# Patient Record
Sex: MALE | Race: WHITE | Employment: OTHER | URBAN - METROPOLITAN AREA
[De-identification: names, ages, dates, MRNs, and addresses within clinical notes are randomized per-mention and may not be internally consistent; named-entity substitution may affect disease eponyms.]

---

## 2018-11-19 ENCOUNTER — HOSPITAL ENCOUNTER (OUTPATIENT)
Age: 73
Discharge: HOME | End: 2018-11-21

## 2018-11-19 LAB
ABO/RH: NORMAL
ANTIBODY SCREEN: NORMAL
HCT VFR BLD CALC: 37.3 % (ref 37–54)
HEMOGLOBIN: 12 G/DL (ref 12.5–16.5)
MCH RBC QN AUTO: 30.8 PG (ref 26–35)
MCHC RBC AUTO-ENTMCNC: 32.2 % (ref 32–34.5)
MCV RBC AUTO: 95.6 FL (ref 80–99.9)
PDW BLD-RTO: 13.3 FL (ref 11.5–15)
PLATELET # BLD: 331 E9/L (ref 130–450)
PMV BLD AUTO: 9 FL (ref 7–12)
RBC # BLD: 3.9 E12/L (ref 3.8–5.8)
WBC # BLD: 6.7 E9/L (ref 4.5–11.5)

## 2018-11-19 PROCEDURE — 87088 URINE BACTERIA CULTURE: CPT

## 2018-11-19 PROCEDURE — 86901 BLOOD TYPING SEROLOGIC RH(D): CPT

## 2018-11-19 PROCEDURE — 86850 RBC ANTIBODY SCREEN: CPT

## 2018-11-19 PROCEDURE — 86900 BLOOD TYPING SEROLOGIC ABO: CPT

## 2018-11-19 PROCEDURE — 85027 COMPLETE CBC AUTOMATED: CPT

## 2018-11-19 PROCEDURE — 87081 CULTURE SCREEN ONLY: CPT

## 2018-11-21 LAB — MRSA CULTURE ONLY: NORMAL

## 2018-11-22 LAB — URINE CULTURE, ROUTINE: NORMAL

## 2018-11-29 ENCOUNTER — HOSPITAL ENCOUNTER (OUTPATIENT)
Age: 73
Discharge: HOME | End: 2018-12-01

## 2018-11-29 LAB
ANION GAP SERPL CALCULATED.3IONS-SCNC: 15 MMOL/L (ref 7–16)
BUN BLDV-MCNC: 16 MG/DL (ref 8–23)
CALCIUM SERPL-MCNC: 8.9 MG/DL (ref 8.6–10.2)
CHLORIDE BLD-SCNC: 92 MMOL/L (ref 98–107)
CO2: 26 MMOL/L (ref 22–29)
CREAT SERPL-MCNC: 0.9 MG/DL (ref 0.7–1.2)
GFR AFRICAN AMERICAN: >60
GFR NON-AFRICAN AMERICAN: >60 ML/MIN/1.73
GLUCOSE BLD-MCNC: 93 MG/DL (ref 74–99)
HCT VFR BLD CALC: 36.9 % (ref 37–54)
HEMOGLOBIN: 11.6 G/DL (ref 12.5–16.5)
POTASSIUM SERPL-SCNC: 3.7 MMOL/L (ref 3.5–5)
SODIUM BLD-SCNC: 133 MMOL/L (ref 132–146)

## 2018-11-29 PROCEDURE — 85014 HEMATOCRIT: CPT

## 2018-11-29 PROCEDURE — 80048 BASIC METABOLIC PNL TOTAL CA: CPT

## 2018-11-29 PROCEDURE — 85018 HEMOGLOBIN: CPT

## 2019-11-12 ENCOUNTER — HOSPITAL ENCOUNTER (OUTPATIENT)
Age: 74
Discharge: HOME | End: 2019-11-14

## 2019-11-12 PROCEDURE — 88304 TISSUE EXAM BY PATHOLOGIST: CPT

## 2020-11-18 ENCOUNTER — OFFICE VISIT (OUTPATIENT)
Dept: PODIATRY | Age: 75
End: 2020-11-18
Payer: MEDICARE

## 2020-11-18 VITALS — HEIGHT: 71 IN | BODY MASS INDEX: 36.4 KG/M2 | TEMPERATURE: 97.4 F | WEIGHT: 260 LBS

## 2020-11-18 PROBLEM — Q66.6 PES VALGUS: Status: ACTIVE | Noted: 2020-11-18

## 2020-11-18 PROBLEM — I87.2 VENOUS INSUFFICIENCY (CHRONIC) (PERIPHERAL): Status: ACTIVE | Noted: 2020-11-18

## 2020-11-18 PROBLEM — S90.821A BLISTER OF RIGHT FOOT: Status: ACTIVE | Noted: 2020-11-18

## 2020-11-18 PROCEDURE — 99203 OFFICE O/P NEW LOW 30 MIN: CPT | Performed by: PODIATRIST

## 2020-11-18 NOTE — LETTER
[address] [facility], [address] [place] [address]    Phone: [phone]  Fax: [phone]    Alley Monzon  [id number]   1945 11/18/2020      Dear Dr. Chace Field,    I would like to thank you for the kind referral of Jeanne Monzon. He presented to the office today for evaluation regarding new onset blister plantar aspect right foot. We did discuss conservative dressing changes to the area to be performed daily. Did discuss use of his custom foot orthoses and supportive shoe gear to allow for improvement in symptoms as well. We will have continued follow-up until issues are resolved. If you should have any questions concerning his visit today, please do not hesitate to contact me.     Sincerely,    Dorice Boas, DPM

## 2020-11-18 NOTE — PROGRESS NOTES
20     Lucita Baig    : 1945 Sex: male   Age: 70 y.o. Patient was referred by: Samira Dockery MD  Patient's PCP/Provider is:  Samira Dockery MD    Subjective:    Patient seen today for evaluation regarding blister plantar aspect right foot. Chief Complaint   Patient presents with    Wound Check       HPI: Patient stated he noticed the area over the last several weeks. He has been trying to treat the area with topical antibiotic and dry dressings without complete improvement noted. Patient is in no acute distress today. He denies any nausea, vomiting, fever, chills. No other additional abnormalities noted at this time. ROS:  Const: Positives and pertinent negatives as per HPI. Musculo: Denies symptoms other than stated above. Neuro: Denies symptoms other than stated above. Skin: Denies symptoms other than stated above. Current Medications:    Current Outpatient Medications:     aspirin 81 MG tablet, Take 81 mg by mouth daily. , Disp: , Rfl:     amLODIPine (NORVASC) 10 MG tablet, Take 10 mg by mouth daily. , Disp: , Rfl:     metoprolol (TOPROL-XL) 50 MG XL tablet, Take 50 mg by mouth daily. , Disp: , Rfl:     losartan-hydrochlorothiazide (HYZAAR) 100-12.5 MG per tablet, Take 1 tablet by mouth daily. , Disp: , Rfl:     pravastatin (PRAVACHOL) 40 MG tablet, Take 40 mg by mouth daily. , Disp: , Rfl:     Allergies:  No Known Allergies    Vitals:    20 1121   Temp: 97.4 °F (36.3 °C)   Weight: 260 lb (117.9 kg)   Height: 5' 11\" (1.803 m)        Past Medical History:   Diagnosis Date    Hyperlipidemia     Hypertension      No family history on file. No past surgical history on file. Social History     Tobacco Use    Smoking status: Never Smoker   Substance Use Topics    Alcohol use: No    Drug use: No           Diagnostic studies:    No results found. Procedures:    None    Exam:  VASCULAR: Pedal pulses palpable right foot.   Capillary fill time less than 5 seconds digits one through 5 right foot. Minimal hair growth noted right foot. NEUROLOGICAL: Paresthesias are noted to the right lower extremity. Protective sensations diminished right lower extremity. DERMATOLOGICAL: Blister region noted plantar right arch area approximately 0.4 cm in diameter. No serous fluid or abscess formation present right foot. No signs of infection noted right foot. Mild callus noted distal aspect right second toe without complicating features noted. MUSCULOSKELETAL: Severe valgus issues noted right foot. Limited range of motion right ankle and subtalar joint. Mild contraction deformities noted lesser digits right foot. Plan Per Assessment  Leif Hi was seen today for wound check. Diagnoses and all orders for this visit:    Blister of right foot, initial encounter    Pes valgus    Venous insufficiency (chronic) (peripheral)        1. New patient evaluation and management  2. We did discuss treatment options regarding the blister region right foot. We did recommend utilizing alginate dressing followed by a dry dressing to be changed daily. 3. Patient is to continue wearing his custom foot orthoses with his new supportive shoe gear he just recently purchased. 4. Patient will be followed up in 1 week's time for continued evaluation and management. He was advised to call the office with any questions or concerns in the interim. Seen By:    Sujata Rose DPM    Electronically signed by Sujata Rose DPM on 11/18/2020 at 11:41 AM      This note was created using voice recognition software. The note was reviewed however may contain grammatical errors.

## 2020-11-25 ENCOUNTER — OFFICE VISIT (OUTPATIENT)
Dept: PODIATRY | Age: 75
End: 2020-11-25
Payer: MEDICARE

## 2020-11-25 VITALS — TEMPERATURE: 97.6 F

## 2020-11-25 PROCEDURE — 4040F PNEUMOC VAC/ADMIN/RCVD: CPT | Performed by: PODIATRIST

## 2020-11-25 PROCEDURE — G8427 DOCREV CUR MEDS BY ELIG CLIN: HCPCS | Performed by: PODIATRIST

## 2020-11-25 PROCEDURE — G8484 FLU IMMUNIZE NO ADMIN: HCPCS | Performed by: PODIATRIST

## 2020-11-25 PROCEDURE — G8417 CALC BMI ABV UP PARAM F/U: HCPCS | Performed by: PODIATRIST

## 2020-11-25 PROCEDURE — 1123F ACP DISCUSS/DSCN MKR DOCD: CPT | Performed by: PODIATRIST

## 2020-11-25 PROCEDURE — 4004F PT TOBACCO SCREEN RCVD TLK: CPT | Performed by: PODIATRIST

## 2020-11-25 PROCEDURE — 99213 OFFICE O/P EST LOW 20 MIN: CPT | Performed by: PODIATRIST

## 2020-11-25 PROCEDURE — 3017F COLORECTAL CA SCREEN DOC REV: CPT | Performed by: PODIATRIST

## 2020-12-31 ENCOUNTER — OFFICE VISIT (OUTPATIENT)
Dept: PODIATRY | Age: 75
End: 2020-12-31
Payer: MEDICARE

## 2020-12-31 VITALS — BODY MASS INDEX: 35.84 KG/M2 | WEIGHT: 256 LBS | HEIGHT: 71 IN

## 2020-12-31 PROBLEM — M20.41 HAMMER TOE OF RIGHT FOOT: Status: ACTIVE | Noted: 2020-12-31

## 2020-12-31 PROBLEM — L84 CORNS AND CALLUS: Status: ACTIVE | Noted: 2020-12-31

## 2020-12-31 PROBLEM — S90.821A BLISTER OF RIGHT FOOT: Status: RESOLVED | Noted: 2020-11-18 | Resolved: 2020-12-31

## 2020-12-31 PROCEDURE — G8427 DOCREV CUR MEDS BY ELIG CLIN: HCPCS | Performed by: PODIATRIST

## 2020-12-31 PROCEDURE — 99213 OFFICE O/P EST LOW 20 MIN: CPT | Performed by: PODIATRIST

## 2020-12-31 PROCEDURE — G8417 CALC BMI ABV UP PARAM F/U: HCPCS | Performed by: PODIATRIST

## 2020-12-31 PROCEDURE — 3017F COLORECTAL CA SCREEN DOC REV: CPT | Performed by: PODIATRIST

## 2020-12-31 PROCEDURE — 1036F TOBACCO NON-USER: CPT | Performed by: PODIATRIST

## 2020-12-31 PROCEDURE — G8484 FLU IMMUNIZE NO ADMIN: HCPCS | Performed by: PODIATRIST

## 2020-12-31 PROCEDURE — 1123F ACP DISCUSS/DSCN MKR DOCD: CPT | Performed by: PODIATRIST

## 2020-12-31 PROCEDURE — 4040F PNEUMOC VAC/ADMIN/RCVD: CPT | Performed by: PODIATRIST

## 2020-12-31 NOTE — PROGRESS NOTES
20     Gus Kong    : 1945   Sex: male    Age: 70 y.o. Patient's PCP/Provider is:  Ariana Rogers MD    Subjective:  Patient is seen today for follow-up regarding continued evaluation regarding painful callus plantar arch region right foot. Patient also having issues with hammertoe deformity right second toe. Patient has been utilizing the moleskin padding in the toe crest pads as previously instructed. He denies any additional issues at this time. He has been wearing his good appropriate shoe gear as instructed. Chief Complaint   Patient presents with    Callouses     RIGHT FOOT        ROS:  Const: Positives and pertinent negatives as per HPI. Musculo: Denies symptoms other than stated above. Neuro: Denies symptoms other than stated above. Skin: Denies symptoms other than stated above. Current Medications:    Current Outpatient Medications:     aspirin 81 MG tablet, Take 81 mg by mouth daily. , Disp: , Rfl:     amLODIPine (NORVASC) 10 MG tablet, Take 10 mg by mouth daily. , Disp: , Rfl:     metoprolol (TOPROL-XL) 50 MG XL tablet, Take 50 mg by mouth daily. , Disp: , Rfl:     losartan-hydrochlorothiazide (HYZAAR) 100-12.5 MG per tablet, Take 1 tablet by mouth daily. , Disp: , Rfl:     pravastatin (PRAVACHOL) 40 MG tablet, Take 40 mg by mouth daily. , Disp: , Rfl:     Allergies:  No Known Allergies    Vitals:    20 0859   Weight: 256 lb (116.1 kg)   Height: 5' 11\" (1.803 m)       Exam:  Neurovascular status unchanged. Hyperkeratotic area noted plantar lateral right midfoot region. Upon partial thickness, nonexcisional debridement no underlying ulceration noted. No signs of infection noted right foot. Hammertoe, elongated right second digital area noted. Mild tenderness noted to palpation due to deformity present. No maceration of webspaces noted right foot. Valgus issues noted right lower extremity.       Diagnostic Studies:     No results

## 2020-12-31 NOTE — PROGRESS NOTES
Patient is in today for evaluation of painful area on the bottom of his right foot. Patient says the area was previously open, but now is a hardened calloused area.  pcp is Tejal Diaz MD

## 2021-01-21 ENCOUNTER — TELEPHONE (OUTPATIENT)
Dept: PODIATRY | Age: 76
End: 2021-01-21

## 2021-01-21 NOTE — TELEPHONE ENCOUNTER
Left couple messages on patients phone informing the missed appt with dr Oscar Vazquez on 1/14/2021, left message to see if patient would like to reschedule and no answer. Patient can call the office back to reschedule if needed.         Electronically signed by Cosme Burrell MA on 1/21/2021 at 9:05 AM

## 2021-08-18 ENCOUNTER — OFFICE VISIT (OUTPATIENT)
Dept: PODIATRY | Age: 76
End: 2021-08-18
Payer: MEDICARE

## 2021-08-18 VITALS — HEIGHT: 72 IN | WEIGHT: 256 LBS | BODY MASS INDEX: 34.67 KG/M2

## 2021-08-18 DIAGNOSIS — Q66.6 PES VALGUS OF RIGHT FOOT: Primary | ICD-10-CM

## 2021-08-18 DIAGNOSIS — M20.41 HAMMER TOE OF RIGHT FOOT: ICD-10-CM

## 2021-08-18 DIAGNOSIS — M79.671 RIGHT FOOT PAIN: ICD-10-CM

## 2021-08-18 DIAGNOSIS — M19.071 ARTHRITIS OF MIDTARSAL JOINT OF RIGHT FOOT: ICD-10-CM

## 2021-08-18 DIAGNOSIS — R26.2 DIFFICULTY WALKING: ICD-10-CM

## 2021-08-18 PROCEDURE — 1123F ACP DISCUSS/DSCN MKR DOCD: CPT | Performed by: PODIATRIST

## 2021-08-18 PROCEDURE — G8427 DOCREV CUR MEDS BY ELIG CLIN: HCPCS | Performed by: PODIATRIST

## 2021-08-18 PROCEDURE — 4040F PNEUMOC VAC/ADMIN/RCVD: CPT | Performed by: PODIATRIST

## 2021-08-18 PROCEDURE — 99213 OFFICE O/P EST LOW 20 MIN: CPT | Performed by: PODIATRIST

## 2021-08-18 PROCEDURE — G8417 CALC BMI ABV UP PARAM F/U: HCPCS | Performed by: PODIATRIST

## 2021-08-18 PROCEDURE — 3017F COLORECTAL CA SCREEN DOC REV: CPT | Performed by: PODIATRIST

## 2021-08-18 PROCEDURE — 1036F TOBACCO NON-USER: CPT | Performed by: PODIATRIST

## 2021-08-18 NOTE — PROGRESS NOTES
Patient is here for right 2nd toe. Patient states the toe is longer than great toe. The skin is callused. Patient states he has right ankle pain. Patient states he fell over the recliner.  Epifanio Tiwari MD 7/19/2021  Electronically signed by Saba Patterson LPN on 9/96/3842 at 9:52 AM
pain.    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 Evaluation and management  2. We did review x-ray studies of the right foot. Midfoot arthrosis and collapse is noted. No acute osseous abnormalities noted. 3. He was given a prescription to get evaluated for custom AFO device right lower extremity. 4. Patient will be followed up at a later date for continued evaluation and management. Discussed the importance of utilizing his good supportive shoe gear with all ambulatory activities. Did recommend patient utilizing compression garments on both lower extremities as well. He was advised to call the office with any questions or concerns in the interim. Seen By:    Blayne Saavedra DPM    Electronically signed by Blayne Saavedra DPM on 8/18/2021 at 7:22 PM    This note was created using voice recognition software. The note was reviewed however may contain grammatical errors.
- - -

## 2021-09-08 DIAGNOSIS — R26.2 DIFFICULTY WALKING: ICD-10-CM

## 2021-09-08 DIAGNOSIS — M19.071 ARTHRITIS OF MIDTARSAL JOINT OF RIGHT FOOT: Primary | ICD-10-CM

## 2021-09-08 DIAGNOSIS — Q66.6 PES VALGUS: ICD-10-CM

## 2022-07-13 LAB — GRAM STAIN ORDERABLE: NORMAL

## 2022-07-14 LAB — ANAEROBIC CULTURE: NORMAL

## 2022-07-15 LAB — WOUND/ABSCESS: NORMAL

## 2022-09-19 ENCOUNTER — HOSPITAL ENCOUNTER (OUTPATIENT)
Age: 77
Discharge: HOME | End: 2022-09-21

## 2022-09-19 PROCEDURE — 88304 TISSUE EXAM BY PATHOLOGIST: CPT

## 2022-10-03 ENCOUNTER — HOSPITAL ENCOUNTER (OUTPATIENT)
Age: 77
Discharge: HOME | End: 2022-10-05

## 2022-10-03 PROCEDURE — 88304 TISSUE EXAM BY PATHOLOGIST: CPT

## 2023-01-01 NOTE — PROGRESS NOTES
20     John Tyler    : 1945   Sex: male    Age: 70 y.o. Patient's PCP/Provider is:  Thierry Reed MD    Subjective:  Patient is seen today for follow-up regarding blister evaluation plantar aspect right foot. Overall patient has been applying the topical medication with noted improvement. He denies any nausea, vomiting, fever, chills. No other additional abnormalities noted at this time. He wanted to discuss other potential treatment options regarding some hammertoe issues mainly right foot. Chief Complaint   Patient presents with    Wound Check       ROS:  Const: Positives and pertinent negatives as per HPI. Musculo: Denies symptoms other than stated above. Neuro: Denies symptoms other than stated above. Skin: Denies symptoms other than stated above. Current Medications:    Current Outpatient Medications:     aspirin 81 MG tablet, Take 81 mg by mouth daily. , Disp: , Rfl:     amLODIPine (NORVASC) 10 MG tablet, Take 10 mg by mouth daily. , Disp: , Rfl:     metoprolol (TOPROL-XL) 50 MG XL tablet, Take 50 mg by mouth daily. , Disp: , Rfl:     losartan-hydrochlorothiazide (HYZAAR) 100-12.5 MG per tablet, Take 1 tablet by mouth daily. , Disp: , Rfl:     pravastatin (PRAVACHOL) 40 MG tablet, Take 40 mg by mouth daily. , Disp: , Rfl:     Allergies:  No Known Allergies    Vitals:    20 0719   Temp: 97.6 °F (36.4 °C)       Exam:  Neurovascular status unchanged. Blister area is improved plantar aspect right arch region. No signs of infection noted right foot. Hammertoe issues are stable bilateral second toes with mild hyperkeratosis noted. No maceration webspaces noted bilateral foot. Valgus issues noted bilateral foot. Diagnostic Studies:     No results found. Procedures:    None    Plan Per Assessment  Jazmin Hoskins was seen today for wound check.     Diagnoses and all orders for this visit:    Blister of right foot, subsequent encounter    Hammer toes of both feet    Pes
Patient is here today for follow up evaluation of blister right foot. He states the wound site still has minimal drainage.
GBS guideline

## 2023-08-03 LAB
MICROORGANISM SPEC CULT: NORMAL
SPECIMEN DESCRIPTION: NORMAL

## 2023-08-08 LAB — HBA1C MFR BLD: 5.5 % (ref 4–5.6)

## 2023-08-17 LAB
MICROORGANISM SPEC CULT: NORMAL
SPECIMEN DESCRIPTION: NORMAL

## 2023-10-20 ENCOUNTER — HOSPITAL ENCOUNTER (OUTPATIENT)
Age: 78
Discharge: HOME | End: 2023-10-22

## 2023-10-20 PROCEDURE — 87015 SPECIMEN INFECT AGNT CONCNTJ: CPT

## 2023-10-20 PROCEDURE — 87205 SMEAR GRAM STAIN: CPT

## 2023-10-20 PROCEDURE — 87116 MYCOBACTERIA CULTURE: CPT

## 2023-10-20 PROCEDURE — 88305 TISSUE EXAM BY PATHOLOGIST: CPT

## 2023-10-20 PROCEDURE — 88311 DECALCIFY TISSUE: CPT

## 2023-10-20 PROCEDURE — 87070 CULTURE OTHR SPECIMN AEROBIC: CPT

## 2023-10-20 PROCEDURE — 87206 SMEAR FLUORESCENT/ACID STAI: CPT

## 2023-10-20 PROCEDURE — 87102 FUNGUS ISOLATION CULTURE: CPT

## 2023-10-20 PROCEDURE — 87075 CULTR BACTERIA EXCEPT BLOOD: CPT

## 2023-10-22 LAB
MICROORGANISM SPEC CULT: ABNORMAL
MICROORGANISM/AGENT SPEC: ABNORMAL
SPECIMEN DESCRIPTION: ABNORMAL

## 2023-10-23 LAB
MICROORGANISM SPEC CULT: NO GROWTH
MICROORGANISM/AGENT SPEC: ABNORMAL
SPECIMEN DESCRIPTION: ABNORMAL

## 2023-10-25 LAB
MICROORGANISM SPEC CULT: NORMAL
MICROORGANISM SPEC CULT: NORMAL
SPECIMEN DESCRIPTION: NORMAL
SPECIMEN DESCRIPTION: NORMAL
SURGICAL PATHOLOGY REPORT: NORMAL

## 2023-10-26 LAB
MICROORGANISM SPEC CULT: NORMAL
MICROORGANISM/AGENT SPEC: NORMAL
SPECIMEN DESCRIPTION: NORMAL

## 2023-10-31 LAB
MICROORGANISM SPEC CULT: NORMAL
MICROORGANISM SPEC CULT: NORMAL
MICROORGANISM/AGENT SPEC: NORMAL
MICROORGANISM/AGENT SPEC: NORMAL
SPECIMEN DESCRIPTION: NORMAL
SPECIMEN DESCRIPTION: NORMAL

## 2023-11-21 LAB
MICROORGANISM SPEC CULT: NORMAL
MICROORGANISM/AGENT SPEC: NORMAL
SPECIMEN DESCRIPTION: NORMAL

## 2025-07-11 ENCOUNTER — HOSPITAL ENCOUNTER (OUTPATIENT)
Age: 80
Discharge: HOME | End: 2025-07-13

## 2025-07-11 PROCEDURE — 88305 TISSUE EXAM BY PATHOLOGIST: CPT

## 2025-07-16 LAB — SURGICAL PATHOLOGY REPORT: NORMAL
